# Patient Record
Sex: MALE | Race: OTHER | HISPANIC OR LATINO
[De-identification: names, ages, dates, MRNs, and addresses within clinical notes are randomized per-mention and may not be internally consistent; named-entity substitution may affect disease eponyms.]

---

## 2018-03-26 PROBLEM — Z00.00 ENCOUNTER FOR PREVENTIVE HEALTH EXAMINATION: Status: ACTIVE | Noted: 2018-03-26

## 2018-03-29 ENCOUNTER — APPOINTMENT (OUTPATIENT)
Dept: OTOLARYNGOLOGY | Facility: CLINIC | Age: 43
End: 2018-03-29
Payer: MEDICAID

## 2018-03-29 DIAGNOSIS — R42 DIZZINESS AND GIDDINESS: ICD-10-CM

## 2018-03-29 DIAGNOSIS — F99 MENTAL DISORDER, NOT OTHERWISE SPECIFIED: ICD-10-CM

## 2018-03-29 DIAGNOSIS — E66.9 OBESITY, UNSPECIFIED: ICD-10-CM

## 2018-03-29 DIAGNOSIS — Z80.3 FAMILY HISTORY OF MALIGNANT NEOPLASM OF BREAST: ICD-10-CM

## 2018-03-29 DIAGNOSIS — J45.909 UNSPECIFIED ASTHMA, UNCOMPLICATED: ICD-10-CM

## 2018-03-29 DIAGNOSIS — J33.9 NASAL POLYP, UNSPECIFIED: ICD-10-CM

## 2018-03-29 DIAGNOSIS — Z83.3 FAMILY HISTORY OF DIABETES MELLITUS: ICD-10-CM

## 2018-03-29 DIAGNOSIS — B19.20 UNSPECIFIED VIRAL HEPATITIS C W/OUT HEPATIC COMA: ICD-10-CM

## 2018-03-29 DIAGNOSIS — H91.93 UNSPECIFIED HEARING LOSS, BILATERAL: ICD-10-CM

## 2018-03-29 DIAGNOSIS — F17.200 NICOTINE DEPENDENCE, UNSPECIFIED, UNCOMPLICATED: ICD-10-CM

## 2018-03-29 DIAGNOSIS — I10 ESSENTIAL (PRIMARY) HYPERTENSION: ICD-10-CM

## 2018-03-29 PROCEDURE — 92557 COMPREHENSIVE HEARING TEST: CPT

## 2018-03-29 PROCEDURE — 31231 NASAL ENDOSCOPY DX: CPT

## 2018-03-29 PROCEDURE — 99205 OFFICE O/P NEW HI 60 MIN: CPT | Mod: 25

## 2018-03-29 PROCEDURE — 92550 TYMPANOMETRY & REFLEX THRESH: CPT

## 2018-03-29 RX ORDER — AMOXICILLIN AND CLAVULANATE POTASSIUM 875; 125 MG/1; MG/1
875-125 TABLET, COATED ORAL
Qty: 28 | Refills: 0 | Status: ACTIVE | COMMUNITY
Start: 2018-03-29 | End: 1900-01-01

## 2018-03-29 RX ORDER — METHYLPREDNISOLONE 4 MG/1
4 TABLET ORAL
Qty: 1 | Refills: 0 | Status: ACTIVE | COMMUNITY
Start: 2018-03-29 | End: 1900-01-01

## 2018-03-29 RX ORDER — IBUPROFEN 100 MG
TABLET,CHEWABLE ORAL
Refills: 0 | Status: ACTIVE | COMMUNITY

## 2018-03-29 RX ORDER — FLUTICASONE PROPIONATE 50 UG/1
50 SPRAY, METERED NASAL DAILY
Qty: 2 | Refills: 3 | Status: ACTIVE | COMMUNITY
Start: 2018-03-29 | End: 1900-01-01

## 2018-03-29 RX ORDER — MECLIZINE HYDROCHLORIDE 25 MG/1
TABLET ORAL
Refills: 0 | Status: ACTIVE | COMMUNITY

## 2018-04-02 PROBLEM — Z00.00 ENCOUNTER FOR PREVENTIVE HEALTH EXAMINATION: Noted: 2018-04-02

## 2018-06-15 ENCOUNTER — APPOINTMENT (OUTPATIENT)
Dept: OTOLARYNGOLOGY | Facility: CLINIC | Age: 43
End: 2018-06-15

## 2018-06-27 ENCOUNTER — APPOINTMENT (OUTPATIENT)
Dept: OTOLARYNGOLOGY | Facility: CLINIC | Age: 43
End: 2018-06-27

## 2018-07-02 ENCOUNTER — APPOINTMENT (OUTPATIENT)
Dept: CARDIOLOGY | Facility: CLINIC | Age: 43
End: 2018-07-02

## 2018-07-30 ENCOUNTER — APPOINTMENT (OUTPATIENT)
Dept: CARDIOLOGY | Facility: CLINIC | Age: 43
End: 2018-07-30

## 2018-08-03 ENCOUNTER — APPOINTMENT (OUTPATIENT)
Dept: PULMONOLOGY | Facility: CLINIC | Age: 43
End: 2018-08-03

## 2018-08-20 ENCOUNTER — APPOINTMENT (OUTPATIENT)
Dept: OTOLARYNGOLOGY | Facility: CLINIC | Age: 43
End: 2018-08-20

## 2018-10-08 ENCOUNTER — EMERGENCY (EMERGENCY)
Facility: HOSPITAL | Age: 43
LOS: 1 days | Discharge: HOME | End: 2018-10-08
Admitting: EMERGENCY MEDICINE
Payer: COMMERCIAL

## 2018-10-08 VITALS
OXYGEN SATURATION: 99 % | TEMPERATURE: 98 F | SYSTOLIC BLOOD PRESSURE: 145 MMHG | DIASTOLIC BLOOD PRESSURE: 78 MMHG | HEART RATE: 80 BPM | RESPIRATION RATE: 18 BRPM
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
## 2018-10-08 DIAGNOSIS — S80.861A INSECT BITE (NONVENOMOUS), RIGHT LOWER LEG, INITIAL ENCOUNTER: ICD-10-CM
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
## 2018-10-08 DIAGNOSIS — S80.862A INSECT BITE (NONVENOMOUS), LEFT LOWER LEG, INITIAL ENCOUNTER: ICD-10-CM

## 2018-10-08 DIAGNOSIS — S40.861A INSECT BITE (NONVENOMOUS) OF RIGHT UPPER ARM, INITIAL ENCOUNTER: ICD-10-CM

## 2018-10-08 DIAGNOSIS — S40.862A INSECT BITE (NONVENOMOUS) OF LEFT UPPER ARM, INITIAL ENCOUNTER: ICD-10-CM
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
## 2018-10-08 DIAGNOSIS — L29.9 PRURITUS, UNSPECIFIED: ICD-10-CM

## 2018-10-08 DIAGNOSIS — S20.469A INSECT BITE (NONVENOMOUS) OF UNSPECIFIED BACK WALL OF THORAX, INITIAL ENCOUNTER: ICD-10-CM
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
## 2018-10-08 PROCEDURE — 99283 EMERGENCY DEPT VISIT LOW MDM: CPT | Mod: 25

## 2018-10-08 PROCEDURE — 96372 THER/PROPH/DIAG INJ SC/IM: CPT

## 2018-10-08 RX ORDER — DIPHENHYDRAMINE HCL 50 MG
25 CAPSULE ORAL ONCE
Qty: 0 | Refills: 0 | Status: COMPLETED | OUTPATIENT
Start: 2018-10-08 | End: 2018-10-08

## 2018-10-08 RX ADMIN — Medication 25 MILLIGRAM(S): at 06:57

## 2018-10-08 NOTE — ED ADULT NURSE NOTE - NSIMPLEMENTINTERV_GEN_ALL_ED
Implemented All Universal Safety Interventions:  [place] to call system. Call bell, personal items and telephone within reach. Instruct patient to call for assistance. Room bathroom lighting operational. Non-slip footwear when patient is off stretcher. Physically safe environment: no spills, clutter or unnecessary equipment. Stretcher in lowest position, wheels locked, appropriate side rails in place.

## 2018-10-08 NOTE — ED ADULT TRIAGE NOTE - ARRIVAL INFO ADDITIONAL COMMENTS
Pt BIBA c/o of multiple bug bite after being in a shelter. Pt states he was told they were from bedbugs. Pt immediately taken to [facility]. Pt with visible reddened bite on his arms and neck.

## 2018-10-08 NOTE — ED PROVIDER NOTE - MEDICAL DECISION MAKING DETAILS
PT WAS IN [facility] AND GOT BITTEN PT CLEANED AND GIVEN INSTRUCTIONS WILL DC I have discussed the discharge plan with the patient. The patient agrees with the plan, as discussed.  The patient understands Emergency Department diagnosis is a preliminary diagnosis often based on limited information and that the patient must adhere to the follow-up plan as discussed.  The patient understands that if the symptoms worsen or if prescribed medications do not have the desired/planned effect that the patient may return to the Emergency Department at any time for further evaluation and treatment.

## 2018-10-08 NOTE — ED PROVIDER NOTE - OBJECTIVE STATEMENT
pt to ed co itch all over body after spending one night in homeless shelter and getting bitten by bed bugs 8/10 no radiation no alleviating factors onset sudden itching  pain no fever no dizzy no headache no chills no NVD no chest pain no SOB no shakes no aches no other  injury no other complaints

## 2019-02-18 NOTE — ED ADULT NURSE NOTE - DOES PATIENT HAVE ADVANCE DIRECTIVE
29M with pmh of asthma, on Fasenra shots and Symbicort, here with worsening productive cough, dyspnea and wheezing x 2 weeks. Pt reports that at times coughs so much that he passes out. +subj fever, URI symptoms. Pt reports that his Fasenra shots got switched from monthly to 1 every 2 months. +admission a few months ago. No prior intubations. No

## 2024-04-08 NOTE — ED PROVIDER NOTE - PRINCIPAL DIAGNOSIS
"Patient ID: Thomas Solo Jr.  : 1964    Chief Complaint: Hernia (3 weeks.), Emesis, GI Problem, and Dental Pain    Allergies: Patient is allergic to naproxen, ondansetron hcl (pf), pseudoephedrine, sulfamethoxazole-trimethoprim, zofran [ondansetron hcl], benzonatate, ondansetron, and tramadol.     History of Present Illness:  Patient with PMHx of anxiety, depression, COPD, asbestos exposure times 25 years, PAD, CHF, hyperlipidemia, HTN, valvular heart disease, anemia, pancreatitis, GERD, chronic abdominal pain, chronic nausea, insomnia, peripheral edema and noncompliance with treatment regimen presents to the clinic with c/o dental pain, weight loss and right inguinal hernia pain.     Patient states this is all malpractice and his  is "ready to play". I asked patient what he is referring to and he stated his weight loss and hernia. States that his primary doctor, Dr Peguero, referred him to Dr Pires in [place] for hernia repair with surgery pending 2024.   Patient seen in 2022 - never returned for f/u until 10/23- at that time he presented with c/o abd pain and nausea- never returned for f/u until now. Patient is known for noncompliance and no shows.     Patient request referral for lung specialist. I explained to patient to f/u with his PCP Dr Peguero for referral.     Social History:  reports that he has quit smoking. His smoking use included cigarettes. He has never used smokeless tobacco. He reports that he does not currently use alcohol. He reports that he does not use drugs.    Past Medical History:  has a past medical history of Acute bronchitis with chronic obstructive pulmonary disease (COPD), Anemia, Asbestos exposure, Asbestosis, CHF (congestive heart failure), NYHA class I, Chronic bronchitis, Disorder of kidney and ureter, Hypertension, Insomnia, Pancreatitis, Primary mesothelioma of lung, and Pulmonary fibrosis.    Surgical History:   Past Surgical History:   Procedure " "Laterality Date    CHOLECYSTECTOMY      CHOLECYSTECTOMY      heart stents          Current Medications:  Current Outpatient Medications   Medication Instructions    albuterol (VENTOLIN HFA) 90 mcg/actuation inhaler 2 puffs, Inhalation, Every 6 hours PRN, Rescue    ALPRAZolam (XANAX) 0.5 mg, Oral, Daily PRN    amoxicillin (AMOXIL) 500 mg, Oral, 3 times daily    CREON 6,000-19,000 -30,000 unit capsule 1 capsule, 3 times daily    famotidine (PEPCID) 20 mg, Oral, Daily    hydrOXYzine HCL (ATARAX) 25 mg, Oral, 3 times daily PRN    levothyroxine (SYNTHROID) 50 mcg, Oral, Before breakfast    losartan (COZAAR) 50 mg, Oral    pantoprazole (PROTONIX) 40 mg, Oral, Daily    promethazine (PHENERGAN) 12.5 mg, Oral, Every 6 hours PRN    SeroqueL  mg, Oral, Daily       Review of Systems   Constitutional:  Positive for unexpected weight change. Negative for chills and fever.   Respiratory:  Positive for shortness of breath (chronic).    Gastrointestinal:  Positive for abdominal pain. Negative for constipation, diarrhea, nausea, vomiting and reflux.        + hernia   Genitourinary:  Negative for difficulty urinating.   All other systems reviewed and are negative.    Visit Vitals  /80   Pulse (!) 58   Temp 98.3 °F (36.8 °C)   Ht 5' 9" (1.753 m)   Wt 67.6 kg (149 lb)   SpO2 97%   BMI 22.00 kg/m²       Physical Exam  Vitals and nursing note reviewed.   Constitutional:       General: He is not in acute distress.     Appearance: Normal appearance. He is not toxic-appearing.   HENT:      Head: Normocephalic and atraumatic.      Nose: Nose normal.      Mouth/Throat:      Mouth: Mucous membranes are moist.      Dentition: Abnormal dentition. Dental caries present.      Pharynx: Oropharynx is clear.   Eyes:      Extraocular Movements: Extraocular movements intact.      Conjunctiva/sclera: Conjunctivae normal.      Pupils: Pupils are equal, round, and reactive to light.   Cardiovascular:      Rate and Rhythm: Normal rate and " regular rhythm.      Heart sounds: Normal heart sounds. No murmur heard.     No friction rub. No gallop.   Pulmonary:      Effort: Pulmonary effort is normal.      Breath sounds: Normal breath sounds.   Abdominal:      Hernia: A hernia is present.   Musculoskeletal:         General: Normal range of motion.      Cervical back: Normal range of motion and neck supple.   Skin:     General: Skin is warm and dry.      Coloration: Skin is not jaundiced or pale.      Findings: No rash.   Neurological:      General: No focal deficit present.      Mental Status: He is alert and oriented to person, place, and time. Mental status is at baseline.   Psychiatric:         Mood and Affect: Mood normal.         Behavior: Behavior normal.         Thought Content: Thought content normal.         Judgment: Judgment normal.          Labs Reviewed:  Chemistry:  Lab Results   Component Value Date     04/08/2024    K 4.4 04/08/2024    CHLORIDE 110 04/08/2024    BUN 21.0 (H) 04/08/2024    CREATININE 1.50 (H) 04/08/2024    EGFRNORACEVR 54 04/08/2024    GLUCOSE 90 04/08/2024    CALCIUM 9.5 04/08/2024    ALKPHOS 57 04/08/2024    LABPROT 7.5 04/08/2024    ALBUMIN 4.2 04/08/2024    BILIDIR 0.10 02/19/2020    IBILI 0.20 02/19/2020    AST 35 04/08/2024    ALT 34 04/08/2024    MG 1.70 (L) 07/26/2023     Hematology:  Lab Results   Component Value Date    WBC 4.63 04/08/2024    RBC 4.33 04/08/2024    HGB 12.6 (L) 04/08/2024    HCT 39.6 04/08/2024    MCV 91.5 04/08/2024    MCH 29.1 04/08/2024    MCHC 31.8 04/08/2024    RDW 14.6 04/08/2024     04/08/2024    MPV 10.5 04/08/2024       Lipid Panel:  Lab Results   Component Value Date    CHOL 159 06/11/2023    HDL 30 (L) 06/11/2023    DLDL 97.4 06/11/2023    TRIG 149 06/11/2023        Assessment & Plan:  1. Chronic abdominal pain  -     Comprehensive Metabolic Panel  -     CBC Auto Differential  -     Lipase    2. Nausea and vomiting, unspecified vomiting type  -     promethazine (PHENERGAN)  12.5 MG Tab; Take 1 tablet (12.5 mg total) by mouth every 6 (six) hours as needed (n/v).  Dispense: 15 tablet; Refill: 1    3. Dental abscess  -     amoxicillin (AMOXIL) 500 MG Tab; Take 1 tablet (500 mg total) by mouth 3 (three) times daily. for 10 days  Dispense: 30 tablet; Refill: 0    4. Colon cancer screening  Ordered placed by MA. If patient completes - plan to send results to his PCP, Dr Antonio Peguero.   -     Cologuard Screening (Multitarget Stool DNA); Future; Expected date: 04/08/2024      Patient instructed to f/u with his PCP, Dr Antonio Peguero.   ER precautions given.   Right inguinal hernia repair pending with Dr Pires 4/19/2024 per patient.       I spent a total of 20 minutes on the day of the visit.  This includes face to face time and non-face to face time preparing to see the patient (eg, review of tests), obtaining and/or reviewing separately obtained history, documenting clinical information in the electronic or other health record, independently interpreting results and communicating results to the patient/family/caregiver, or care coordinator.     Bedbug bite, initial encounter